# Patient Record
Sex: MALE | Race: WHITE | NOT HISPANIC OR LATINO | Employment: OTHER | URBAN - NONMETROPOLITAN AREA
[De-identification: names, ages, dates, MRNs, and addresses within clinical notes are randomized per-mention and may not be internally consistent; named-entity substitution may affect disease eponyms.]

---

## 2023-02-23 ENCOUNTER — OFFICE VISIT (OUTPATIENT)
Dept: URGENT CARE | Facility: CLINIC | Age: 68
End: 2023-02-23

## 2023-02-23 VITALS
RESPIRATION RATE: 18 BRPM | SYSTOLIC BLOOD PRESSURE: 128 MMHG | HEART RATE: 58 BPM | DIASTOLIC BLOOD PRESSURE: 68 MMHG | OXYGEN SATURATION: 97 % | TEMPERATURE: 97.9 F

## 2023-02-23 DIAGNOSIS — J01.00 ACUTE NON-RECURRENT MAXILLARY SINUSITIS: Primary | ICD-10-CM

## 2023-02-23 RX ORDER — SIMVASTATIN 20 MG
20 TABLET ORAL DAILY
COMMUNITY
Start: 2023-01-25

## 2023-02-23 RX ORDER — BENZONATATE 200 MG/1
200 CAPSULE ORAL 3 TIMES DAILY PRN
COMMUNITY
Start: 2022-09-08

## 2023-02-23 RX ORDER — ISOSORBIDE MONONITRATE 60 MG/1
60 TABLET, EXTENDED RELEASE ORAL 2 TIMES DAILY
COMMUNITY
Start: 2022-12-01

## 2023-02-23 RX ORDER — PANTOPRAZOLE SODIUM 40 MG/1
40 TABLET, DELAYED RELEASE ORAL DAILY
COMMUNITY
Start: 2023-02-01

## 2023-02-23 RX ORDER — DILTIAZEM HYDROCHLORIDE 180 MG/1
180 CAPSULE, COATED, EXTENDED RELEASE ORAL 2 TIMES DAILY
COMMUNITY
Start: 2022-12-01

## 2023-02-23 RX ORDER — METOPROLOL SUCCINATE 50 MG/1
50 TABLET, EXTENDED RELEASE ORAL 3 TIMES DAILY
COMMUNITY
Start: 2022-12-01

## 2023-02-23 RX ORDER — AZITHROMYCIN 250 MG/1
TABLET, FILM COATED ORAL
Qty: 6 TABLET | Refills: 0 | Status: SHIPPED | OUTPATIENT
Start: 2023-02-23 | End: 2023-02-27

## 2023-02-23 RX ORDER — ASPIRIN 81 MG/1
162 TABLET ORAL DAILY
COMMUNITY

## 2023-02-23 RX ORDER — LOSARTAN POTASSIUM 100 MG/1
100 TABLET ORAL DAILY
COMMUNITY
Start: 2022-12-01

## 2023-02-23 RX ORDER — NITROGLYCERIN 80 MG/1
PATCH TRANSDERMAL
COMMUNITY
Start: 2023-01-25

## 2023-02-23 NOTE — PROGRESS NOTES
[facility]        NAME: Fortino Christensen is a 80 y o  male  : 1955    MRN: [id number]  DATE: 2023  TIME: 11:28 AM    Assessment and Plan   Acute non-recurrent maxillary sinusitis [J01 00]  1  Acute non-recurrent maxillary sinusitis  azithromycin (ZITHROMAX) 250 mg tablet        Problem with patient and his wife  Prescribing Z-Toan for sinusitis symptoms as the patient states that it always work for his for the symptoms  Patient has a previous prescription for [facility] and advised to start taking  Should continue with over-the-counter medications and should take Coricidin due to history of high blood pressure  Should be pushing fluids and follow-up with PCP if symptoms do not improve and report to the ER symptoms worsen  Patient Instructions       Follow up with PCP in 3-5 days  Proceed to  ER if symptoms worsen  Chief Complaint     Chief Complaint   Patient presents with   • Cough   • sinus drainage   • sinus congestion   • Fatigue   • Headache     Started 1 week ago  OTC cough medication  No home COVID test         History of Present Illness       Cough  This is a new problem  The current episode started in the past 7 days  The problem has been unchanged  The problem occurs constantly  The cough is non-productive  Associated symptoms include headaches, nasal congestion, postnasal drip and rhinorrhea  Pertinent negatives include no chest pain, chills, ear congestion, ear pain, fever, heartburn, hemoptysis, myalgias, rash, sore throat, shortness of breath, sweats, weight loss or wheezing  Nothing aggravates the symptoms  He has tried OTC cough suppressant for the symptoms  The treatment provided mild relief  His past medical history is significant for environmental allergies  There is no history of asthma, bronchiectasis, bronchitis, COPD, emphysema or pneumonia  Fatigue  Associated symptoms include congestion, coughing, fatigue and headaches   Pertinent negatives include no abdominal pain, chest pain, chills, fever, myalgias, nausea, rash, sore throat or vomiting  Headache      Review of Systems   Review of Systems   Constitutional: Positive for fatigue  Negative for chills, fever and weight loss  HENT: Positive for congestion, postnasal drip, rhinorrhea, sinus pressure and sinus pain  Negative for ear pain and sore throat  Respiratory: Positive for cough  Negative for hemoptysis, shortness of breath, wheezing and stridor  Cardiovascular: Negative for chest pain and palpitations  Gastrointestinal: Negative for abdominal pain, constipation, diarrhea, heartburn, nausea and vomiting  Musculoskeletal: Negative for myalgias  Skin: Negative for rash  Allergic/Immunologic: Positive for environmental allergies  Neurological: Positive for headaches  Negative for dizziness, syncope and light-headedness           Current Medications       Current Outpatient Medications:   •  aspirin (ECOTRIN LOW STRENGTH) 81 mg EC tablet, Take 162 mg by mouth daily, Disp: , Rfl:   •  azithromycin (ZITHROMAX) 250 mg tablet, Take 2 tablets today then 1 tablet daily x 4 days, Disp: 6 tablet, Rfl: 0  •  benzonatate (TESSALON) 200 MG capsule, Take 200 mg by mouth Three times daily as needed, Disp: , Rfl:   •  Cholecalciferol 25 MCG (1000 UT) tablet, Take 1,000 Units by mouth daily, Disp: , Rfl:   •  diltiazem (CARDIZEM CD) 180 mg 24 hr capsule, Take 180 mg by mouth 2 (two) times a day, Disp: , Rfl:   •  isosorbide mononitrate (IMDUR) 60 mg 24 hr tablet, Take 60 mg by mouth 2 (two) times a day, Disp: , Rfl:   •  losartan (COZAAR) 100 MG tablet, Take 100 mg by mouth daily, Disp: , Rfl:   •  metFORMIN (GLUCOPHAGE) 500 mg tablet, , Disp: , Rfl:   •  metoprolol succinate (TOPROL-XL) 50 mg 24 hr tablet, Take 50 mg by mouth 3 (three) times a day, Disp: , Rfl:   •  nitroglycerin (NITRODUR) 0 4 mg/hr, APPLY 1 PATCH TOPICALLY ONCE DAILY, Disp: , Rfl:   •  pantoprazole (PROTONIX) 40 mg tablet, Take 40 mg by mouth daily, Disp: , Rfl:   •  simvastatin (ZOCOR) 20 mg tablet, Take 20 mg by mouth daily, Disp: , Rfl:     Current Allergies     Allergies as of 02/23/2023   • (No Known Allergies)            The following portions of the patient's history were reviewed and updated as appropriate: allergies, current medications, past family history, past medical history, past social history, past surgical history and problem list      Past Medical History:   Diagnosis Date   • Diabetes mellitus ([facility] [address] )    • GERD (gastroesophageal reflux disease)    • Heart disease        Past Surgical History:   Procedure Laterality Date   • APPENDECTOMY     • CARDIAC SURGERY     • CHOLECYSTECTOMY         History reviewed  No pertinent family history  Medications have been verified  Objective   /68   Pulse 58   Temp 97 9 °F (36 6 °C)   Resp 18   SpO2 97%        Physical Exam     Physical Exam  Vitals and nursing note reviewed  Constitutional:       General: He is not in acute distress  Appearance: Normal appearance  He is normal weight  He is not ill-appearing, toxic-appearing or diaphoretic  HENT:      Head: Normocephalic  Right Ear: Tympanic membrane, ear canal and external ear normal  There is no impacted cerumen  Left Ear: Tympanic membrane, ear canal and external ear normal  There is no impacted cerumen  Nose: Congestion and rhinorrhea present  Mouth/Throat:      Mouth: Mucous membranes are moist       Pharynx: Oropharynx is clear  No oropharyngeal exudate or posterior oropharyngeal erythema  Eyes:      General:         Right eye: No discharge  Left eye: No discharge  Extraocular Movements: Extraocular movements intact  Conjunctiva/sclera: Conjunctivae normal       Pupils: Pupils are equal, round, and reactive to light  Neck:      Vascular: No carotid bruit  Cardiovascular:      Rate and Rhythm: Normal rate and regular rhythm  Pulses: Normal pulses  Heart sounds: Normal heart sounds  No murmur heard  No friction rub  No gallop  Pulmonary:      Effort: Pulmonary effort is normal  No respiratory distress  Breath sounds: Normal breath sounds  No stridor  No wheezing, rhonchi or rales  Chest:      Chest wall: No tenderness  Musculoskeletal:         General: No swelling, tenderness or signs of injury  Normal range of motion  Cervical back: Normal range of motion and neck supple  No rigidity or tenderness  Lymphadenopathy:      Cervical: No cervical adenopathy  Skin:     General: Skin is warm and dry  Capillary Refill: Capillary refill takes less than 2 seconds  Coloration: Skin is not jaundiced or pale  Findings: No erythema  Neurological:      General: No focal deficit present  Mental Status: He is alert and oriented to person, place, and time     Psychiatric:         Mood and Affect: Mood normal          Behavior: Behavior normal

## 2023-02-23 NOTE — PATIENT INSTRUCTIONS
Fever/Body Aches: OTC Tylenol, ibuprofen, motrin as directed  Cough: OTC Robitussin or Delsym cough syrup  Sore Throat: Warm saltwater gargles, honey, drink plenty of liquids, soft foods  If severe, can utilize OTC chloraseptic spray  Nasal Congestion: OTC saline nasal spray use as directed or OTC flonase, OTC decongestants such as Sudafed as long as no history of high blood pressure   Cool mist humidifier  Vitamin C and Zinc for immune support     Corcidin for high blood pressure

## 2023-09-09 ENCOUNTER — OFFICE VISIT (OUTPATIENT)
Dept: URGENT CARE | Facility: CLINIC | Age: 68
End: 2023-09-09
Payer: COMMERCIAL

## 2023-09-09 VITALS
HEART RATE: 71 BPM | OXYGEN SATURATION: 96 % | TEMPERATURE: 96.9 F | RESPIRATION RATE: 20 BRPM | DIASTOLIC BLOOD PRESSURE: 77 MMHG | SYSTOLIC BLOOD PRESSURE: 109 MMHG

## 2023-09-09 DIAGNOSIS — J01.40 ACUTE NON-RECURRENT PANSINUSITIS: Primary | ICD-10-CM

## 2023-09-09 LAB
SARS-COV-2 AG UPPER RESP QL IA: NEGATIVE
VALID CONTROL: NORMAL

## 2023-09-09 PROCEDURE — 87811 SARS-COV-2 COVID19 W/OPTIC: CPT

## 2023-09-09 PROCEDURE — 99213 OFFICE O/P EST LOW 20 MIN: CPT

## 2023-09-09 RX ORDER — AZITHROMYCIN 250 MG/1
TABLET, FILM COATED ORAL
Qty: 6 TABLET | Refills: 0 | Status: SHIPPED | OUTPATIENT
Start: 2023-09-09 | End: 2023-09-13

## 2023-09-09 NOTE — PATIENT INSTRUCTIONS
Internal Medicine Nephrology Reassurance provided that [facility] lungs are clear on examination today. Majority of cases are viral and will not require antibiotic treatment. Take antibiotics as prescribed. Complete entire course of antibiotics even if feeling better. Eat yogurt with live and active cultures and/or take a probiotic at least 3 hours before or after antibiotic dose. Monitor stool for diarrhea and/or blood. If this occurs, contact primary care doctor ASAP. Take over the counter Mucinex during the day  Take Tessalon as prescribed    Recommend the following options: room humidifier, vicks vapo rub, hot/steamy shower (practice proper safety precautions when handing hot liquids/steam)  Offer fluids frequently to help with hydration, as staying hydrated helps loosen up thick mucous. May drink warm water with honey. May use lemon. Warm compresses over sinuses  Over the counter saline nasal spray    Tylenol/Ibuprofen for pain/fever. Encourage coughing into the elbow instead of the hand. Washing hands frequently with warm water and soap may help stop spread of infection. If symptoms do not improve, please follow with PCP in 3-5 days for further evaluation. Proceed to ER if you become acutely short of breath, you have any difficulty breathing, you develop chest pain or any worsening symptoms. Internal Medicine

## 2023-09-09 NOTE — PROGRESS NOTES
[facility]        NAME: Emanuel Castillo is a 80 y.o. male  : 1955    MRN: [id number]  DATE: 2023  TIME: 3:07 PM    Assessment and Plan   Acute non-recurrent pansinusitis [J01.40]  1. Acute non-recurrent pansinusitis  Poct Covid 19 Rapid Antigen Test    azithromycin (ZITHROMAX) 250 mg tablet        POCT COVID 19 Rapid Antigen Test: Negative/Positive    Per pt, Azithromycin always works for his symptoms. Will order a Z-glo. States this is the worst it has been in the past 2 years. Pt notes a previous prescription for Tessalon Perles which help soothe his cough. He reports getting refills from his PCP. Patient Instructions   Reassurance provided that Emanuel Castillo lungs are clear on examination today. Majority of cases are viral and will not require antibiotic treatment. Take antibiotics as prescribed. Complete entire course of antibiotics even if feeling better. Eat yogurt with live and active cultures and/or take a probiotic at least 3 hours before or after antibiotic dose. Monitor stool for diarrhea and/or blood. If this occurs, contact primary care doctor ASAP. Take over the counter Mucinex during the day  Take Tessalon as previously prescribed    Recommend the following options: room humidifier, vicks vapo rub, hot/steamy shower (practice proper safety precautions when handing hot liquids/steam)  Offer fluids frequently to help with hydration, as staying hydrated helps loosen up thick mucous. May drink warm water with honey. May use lemon. Warm compresses over sinuses  Over the counter saline nasal spray    Tylenol/Ibuprofen for pain/fever. Encourage coughing into the elbow instead of the hand. Washing hands frequently with warm water and soap may help stop spread of infection. If symptoms do not improve, please follow with PCP in 3-5 days for further evaluation.     Proceed to ER if you become acutely short of breath, you have any difficulty breathing, you develop chest pain or any worsening symptoms. Chief Complaint     Chief Complaint   Patient presents with   • Cold Like Symptoms     Head cold since Thursday. No fevers. Taking tylenol, LD this am.  No appetite. Throat is scratchy. Did not take a covid test.  Complains of a headache. History of Present Illness       URI   This is a new problem. The current episode started in the past 7 days. The problem has been gradually worsening. There has been no fever. Associated symptoms include congestion, coughing, diarrhea (Chronic from Metformin per pt), headaches, nausea, rhinorrhea, sinus pain and a sore throat (Scratchy ). Pertinent negatives include no abdominal pain, chest pain, dysuria, ear pain, sneezing, vomiting or wheezing. He has tried acetaminophen (Tessalon Perles) for the symptoms. The treatment provided no relief. Review of Systems   Review of Systems   Constitutional: Positive for activity change and appetite change. Negative for chills, diaphoresis, fatigue and fever. HENT: Positive for congestion, rhinorrhea, sinus pressure, sinus pain and sore throat (Scratchy ). Negative for ear pain, sneezing, trouble swallowing and voice change. Respiratory: Positive for cough. Negative for wheezing. Cardiovascular: Negative. Negative for chest pain. Gastrointestinal: Positive for diarrhea (Chronic from Metformin per pt) and nausea. Negative for abdominal pain, blood in stool, constipation and vomiting. Genitourinary: Negative for dysuria, frequency and urgency. Musculoskeletal: Negative for myalgias. Neurological: Positive for headaches.          Current Medications       Current Outpatient Medications:   •  aspirin (ECOTRIN LOW STRENGTH) 81 mg EC tablet, Take 162 mg by mouth daily, Disp: , Rfl:   •  azithromycin (ZITHROMAX) 250 mg tablet, Take 2 tablets today then 1 tablet daily x 4 days, Disp: 6 tablet, Rfl: 0  •  benzonatate (TESSALON) 200 MG capsule, Take 200 mg by mouth Three times daily as needed, Disp: , Rfl:   •  Cholecalciferol 25 MCG (1000 UT) tablet, Take 1,000 Units by mouth daily, Disp: , Rfl:   •  diltiazem (CARDIZEM CD) 180 mg 24 hr capsule, Take 180 mg by mouth 2 (two) times a day, Disp: , Rfl:   •  isosorbide mononitrate (IMDUR) 60 mg 24 hr tablet, Take 60 mg by mouth 2 (two) times a day, Disp: , Rfl:   •  losartan (COZAAR) 100 MG tablet, Take 100 mg by mouth daily, Disp: , Rfl:   •  metFORMIN (GLUCOPHAGE) 500 mg tablet, , Disp: , Rfl:   •  metoprolol succinate (TOPROL-XL) 50 mg 24 hr tablet, Take 50 mg by mouth 3 (three) times a day, Disp: , Rfl:   •  nitroglycerin (NITRODUR) 0.4 mg/hr, APPLY 1 PATCH TOPICALLY ONCE DAILY, Disp: , Rfl:   •  pantoprazole (PROTONIX) 40 mg tablet, Take 40 mg by mouth daily, Disp: , Rfl:   •  simvastatin (ZOCOR) 20 mg tablet, Take 20 mg by mouth daily, Disp: , Rfl:     Current Allergies     Allergies as of 09/09/2023   • (No Known Allergies)            The following portions of the patient's history were reviewed and updated as appropriate: allergies, current medications, past family history, past medical history, past social history, past surgical history and problem list.     Past Medical History:   Diagnosis Date   • Diabetes mellitus ([address])    • GERD (gastroesophageal reflux disease)    • Heart disease    • Hypertension        Past Surgical History:   Procedure Laterality Date   • APPENDECTOMY     • CARDIAC SURGERY     • CHOLECYSTECTOMY         No family history on file. Medications have been verified. Objective   /77 (BP Location: Right arm)   Pulse 71   Temp (!) 96.9 °F (36.1 °C) (Tympanic)   Resp 20   SpO2 96%        Physical Exam     Physical Exam  Vitals and nursing note reviewed. Constitutional:       General: He is not in acute distress. Appearance: Normal appearance. He is not ill-appearing, toxic-appearing or diaphoretic. HENT:      Head: Normocephalic.       Right Ear: Tympanic membrane, ear canal and external ear normal.      Left Ear: Tympanic membrane, ear canal and external ear normal.      Nose: Rhinorrhea present. Right Sinus: Maxillary sinus tenderness and frontal sinus tenderness present. Left Sinus: Maxillary sinus tenderness and frontal sinus tenderness present. Mouth/Throat:      Mouth: Mucous membranes are moist.      Pharynx: Posterior oropharyngeal erythema present. No oropharyngeal exudate. Cardiovascular:      Rate and Rhythm: Normal rate and regular rhythm. Pulses: Normal pulses. Heart sounds: Normal heart sounds. No murmur heard. Pulmonary:      Effort: Pulmonary effort is normal. No respiratory distress. Breath sounds: Normal breath sounds. No stridor. No wheezing, rhonchi or rales. Chest:      Chest wall: No tenderness. Musculoskeletal:         General: Normal range of motion. Cervical back: Normal range of motion. Skin:     General: Skin is warm. Findings: No rash. Neurological:      Mental Status: He is alert.

## 2025-03-07 ENCOUNTER — OFFICE VISIT (OUTPATIENT)
Dept: URGENT CARE | Facility: CLINIC | Age: 70
End: 2025-03-07
Payer: COMMERCIAL

## 2025-03-07 VITALS
SYSTOLIC BLOOD PRESSURE: 101 MMHG | OXYGEN SATURATION: 98 % | TEMPERATURE: 96.4 F | RESPIRATION RATE: 18 BRPM | HEART RATE: 68 BPM | DIASTOLIC BLOOD PRESSURE: 70 MMHG

## 2025-03-07 DIAGNOSIS — J06.9 VIRAL URI WITH COUGH: Primary | ICD-10-CM

## 2025-03-07 PROCEDURE — 87636 SARSCOV2 & INF A&B AMP PRB: CPT

## 2025-03-07 PROCEDURE — 99213 OFFICE O/P EST LOW 20 MIN: CPT

## 2025-03-07 NOTE — PROGRESS NOTES
[facility]        NAME: David Lawton Sr. is a 70 y.o. male  : 1955    MRN: [id number]  DATE: 2025  TIME: 3:02 PM    Assessment and Plan   Viral URI with cough [J06.9]  1. Viral URI with cough  Covid/Flu- Office Collect Normal        Flu swab sent.  Suspect viral illness. OTC treatments recommended (Coricidin HBP).  Lung exam normal. Return precautions given.  Follow up if symptoms worsen.     Patient Instructions       Follow up with PCP in 3-5 days.  Proceed to  ER if symptoms worsen.    If tests have been performed at [facility], our office will contact you with results if changes need to be made to the care plan discussed with you at the visit.  [facility]    Chief Complaint     Chief Complaint   Patient presents with    Cold Like Symptoms     Pt c/o cough, body aches, stuffy head, nasal merry, chills, fever, stomach pain. Started last week but got worse on wednesday         History of Present Illness       70-year-old male with history of hypertension presents with 1 week of cough, congestion, fevers, body aches.  Reports Tmax of 99.5.  He has Tessalon Perles at home do not do anything.  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  Patient also reports abdominal pain when he coughs.        Review of Systems   Review of Systems   Constitutional:  Positive for chills and fever.   HENT:  Positive for congestion, rhinorrhea and sinus pressure. Negative for ear pain and sore throat.    Respiratory:  Positive for cough. Negative for chest tightness, shortness of breath and wheezing.    Cardiovascular:  Negative for chest pain.   Gastrointestinal:  Negative for diarrhea, nausea and vomiting.   Musculoskeletal:  Positive for myalgias.   Neurological:  Negative for dizziness and headaches.         Current Medications       Current Outpatient Medications:     aspirin (ECOTRIN LOW STRENGTH) 81 mg  EC tablet, Take 162 mg by mouth daily, Disp: , Rfl:     benzonatate (TESSALON) 200 MG capsule, Take 200 mg by mouth Three times daily as needed, Disp: , Rfl:     benzonatate (TESSALON) 200 MG capsule, TAKE 1 CAPSULE BY MOUTH TWICE DAILY AS NEEDED FOR COUGH, Disp: , Rfl:     Cartia  MG 24 hr capsule, Take 180 mg by mouth 2 (two) times a day, Disp: , Rfl:     Cholecalciferol 25 MCG (1000 UT) tablet, Take 1,000 Units by mouth daily, Disp: , Rfl:     dapagliflozin (Farxiga) 10 MG tablet, Take 10 mg by mouth daily, Disp: , Rfl:     diltiazem (CARDIZEM CD) 180 mg 24 hr capsule, Take 180 mg by mouth 2 (two) times a day, Disp: , Rfl:     fluticasone (FLONASE) 50 mcg/act nasal spray, 2 sprays into each nostril daily, Disp: 11.1 mL, Rfl: 0    isosorbide mononitrate (IMDUR) 60 mg 24 hr tablet, Take 60 mg by mouth 2 (two) times a day, Disp: , Rfl:     isosorbide mononitrate (IMDUR) 60 mg 24 hr tablet, , Disp: , Rfl:     losartan (COZAAR) 100 MG tablet, Take 100 mg by mouth daily, Disp: , Rfl:     losartan (COZAAR) 100 MG tablet, Take 100 mg by mouth daily, Disp: , Rfl:     metFORMIN (GLUCOPHAGE) 500 mg tablet, , Disp: , Rfl:     metFORMIN (GLUCOPHAGE) 500 mg tablet, Take 500 mg by mouth 2 (two) times a day with meals, Disp: , Rfl:     metoprolol succinate (TOPROL-XL) 50 mg 24 hr tablet, Take 50 mg by mouth 3 (three) times a day, Disp: , Rfl:     metoprolol succinate (TOPROL-XL) 50 mg 24 hr tablet, Take 50 mg by mouth 3 (three) times a day, Disp: , Rfl:     nitroglycerin (NITRODUR) 0.4 mg/hr, APPLY 1 PATCH TOPICALLY ONCE DAILY, Disp: , Rfl:     nitroglycerin (NITRODUR) 0.4 mg/hr, APPLY 1 PATCH TOPICALLY ONCE DAILY, Disp: , Rfl:     pantoprazole (PROTONIX) 40 mg tablet, Take 40 mg by mouth daily, Disp: , Rfl:     pantoprazole (PROTONIX) 40 mg tablet, Take 40 mg by mouth daily, Disp: , Rfl:     promethazine-dextromethorphan (PHENERGAN-DM) 6.25-15 mg/5 mL oral syrup, Take 5 mL by mouth 4 (four) times a day as needed for  cough, Disp: 118 mL, Rfl: 0    simvastatin (ZOCOR) 20 mg tablet, Take 20 mg by mouth daily, Disp: , Rfl:     simvastatin (ZOCOR) 20 mg tablet, Take 20 mg by mouth daily, Disp: , Rfl:     Current Allergies     Allergies as of 03/07/2025    (No Known Allergies)            The following portions of the patient's history were reviewed and updated as appropriate: allergies, current medications, past family history, past medical history, past social history, past surgical history and problem list.     Past Medical History:   Diagnosis Date    CHF (congestive heart failure) (HCC)     Diabetes mellitus (HCC)     GERD (gastroesophageal reflux disease)     Heart disease     Hyperlipidemia     Hypertension        Past Surgical History:   Procedure Laterality Date    APPENDECTOMY      CARDIAC OTHER      CARDIAC SURGERY      CHOLECYSTECTOMY      DENTAL SURGERY         History reviewed. No pertinent family history.      Medications have been verified.        Objective   /70   Pulse 68   Temp (!) 96.4 °F (35.8 °C) (Temporal)   Resp 18   SpO2 98%   No LMP for male patient.       Physical Exam     Physical Exam  Vitals and nursing note reviewed.   Constitutional:       General: He is not in acute distress.     Appearance: Normal appearance. He is not ill-appearing, toxic-appearing or diaphoretic.   HENT:      Head: Normocephalic and atraumatic.      Jaw: No trismus.      Right Ear: Tympanic membrane, ear canal and external ear normal.      Left Ear: Tympanic membrane, ear canal and external ear normal.      Nose: Rhinorrhea present.      Mouth/Throat:      Mouth: Mucous membranes are moist.      Pharynx: Oropharynx is clear. Uvula midline. Postnasal drip present. No oropharyngeal exudate or posterior oropharyngeal erythema.      Tonsils: No tonsillar exudate or tonsillar abscesses.   Eyes:      Conjunctiva/sclera: Conjunctivae normal.   Cardiovascular:      Rate and Rhythm: Normal rate and regular rhythm.      Heart  sounds: Normal heart sounds. No murmur heard.     No friction rub. No gallop.   Pulmonary:      Effort: Pulmonary effort is normal. No respiratory distress.      Breath sounds: Normal breath sounds. No stridor. No wheezing, rhonchi or rales.   Musculoskeletal:      Cervical back: No rigidity.   Lymphadenopathy:      Cervical: No cervical adenopathy.   Skin:     General: Skin is warm and dry.      Capillary Refill: Capillary refill takes less than 2 seconds.   Neurological:      General: No focal deficit present.      Mental Status: He is alert.   Psychiatric:         Mood and Affect: Mood normal.         Behavior: Behavior normal.

## 2025-03-08 ENCOUNTER — RESULTS FOLLOW-UP (OUTPATIENT)
Dept: URGENT CARE | Facility: CLINIC | Age: 70
End: 2025-03-08

## 2025-03-08 LAB
FLUAV RNA RESP QL NAA+PROBE: POSITIVE
FLUBV RNA RESP QL NAA+PROBE: NEGATIVE
SARS-COV-2 RNA RESP QL NAA+PROBE: NEGATIVE